# Patient Record
Sex: MALE | Race: WHITE | URBAN - METROPOLITAN AREA
[De-identification: names, ages, dates, MRNs, and addresses within clinical notes are randomized per-mention and may not be internally consistent; named-entity substitution may affect disease eponyms.]

---

## 2017-02-19 DIAGNOSIS — M62.830 MUSCLE SPASM OF BACK: ICD-10-CM

## 2017-02-27 ENCOUNTER — TELEPHONE (OUTPATIENT)
Dept: INTERNAL MEDICINE CLINIC | Age: 42
End: 2017-02-27

## 2017-02-27 RX ORDER — NAPROXEN 500 MG/1
TABLET, DELAYED RELEASE ORAL
Qty: 30 TAB | Refills: 0 | Status: SHIPPED | OUTPATIENT
Start: 2017-02-27

## 2017-02-27 NOTE — TELEPHONE ENCOUNTER
----- Message from Ghanshyam Wiggins sent at 2/25/2017  1:42 PM EST -----  Regarding: Dr. All Sanon [phone]        Pt is requesting a call back to discuss CPE done on 12/29/16.

## 2017-03-28 NOTE — TELEPHONE ENCOUNTER
Labs were normal on last annual exam.   Follow up if you have any further concerns or leave direct message with the nurse.    Agueda Panda MD